# Patient Record
(demographics unavailable — no encounter records)

---

## 2024-12-10 NOTE — PROCEDURE
[de-identified] :   Procedure: Trigger Point Injections Physician: Dr. Smith Medication injected: Lidocaine 1%, 5cc total Sedation medications: None Estimated blood loss: None Complications: None   Technique: R/B/A to trigger point injection reviewed with patient. The patient is agreeable and wishes to proceed.   The patient was placed in the seated position and trigger points of the bilateral trapezius, levator scapulae and cervical paraspinals were identified. The area was prepped in normal sterile fashion with Chloroprep. A 27 gauge 1.25 inch needle was advanced into the palpable trigger points with reproduction of pain. After negative aspiration of heme, the above medications were injected into the trigger areas. Needle was then removed, bandaid placed over injection sites. There was no complications, the patient was provided with post injection instructions.

## 2024-12-10 NOTE — HISTORY OF PRESENT ILLNESS
[FreeTextEntry1] : Ms. DEEPAK HONG is a 59 year old female who presents for follow up. At last visit in 8/2024, she was given a TPI, continued on HEP, and continued on Gabapentin. She got significant relief (75%)  from TPIs for over 6 weeks until pain returned. She is still taking Gabapentin occasionally for the radicular pain. Denies any new symptoms.    Location: R>L posterior neck/shoulder region Onset:Chronic, progressive symptoms, patient went to chiropractor who exacerbated pain Provocation/Palliative: Worst with exercise, lifting heavy bags, reading when moving neck, TPI helped alleviate symptoms, muscle relaxant Quality: Dull aching Radiation: R>L neck to R>L shoulder Severity: 5/10 Timing: constant  No bowel/bladder changes. No groin numbness.

## 2024-12-10 NOTE — ASSESSMENT
[FreeTextEntry1] : Ms. DEEPAK HONG is a 59 year old female with a PMHx of Breast CA who presents with neck and upper back pain. Pain likely myofascial in nature. Has had good relief with TPIs in past. Did have some pain into her R shoulder/arm, improved with gabapentin. Denies any red flag signs. Will recommend: - TPI performed today, tolerated well - Continue HEP - Given that she gets good relief with TPIs, would not recommend AMILCAR at this time - Continue with Gabapentin 100-200mg PO Qhs if needed. Refill sent today. Patient taking as prn due to significant fatigue with medication when taken. Discussed efficacy improves with daily basis but if unable to tolerate daily continue w/ prn. Patient aware of side effects and risks including sedation, leg swelling, and possible mood changes. She will send me recent bloodwork for review.    RTC in 3-4 months or sooner if needed. Patient aware of red flag signs including any changes to their bowel/bladder control, groin numbness or new weakness. Patient knows to seek immediate attention by calling 911 or going to nearest ER if these symptoms appear.  This patient is being managed for a complex chronic pain that requires ongoing medical management. The nature of this condition requires a longitudinal relationship and monitoring over time for appropriate treatment.

## 2024-12-10 NOTE — PHYSICAL EXAM
[FreeTextEntry1] : PE:  Constitutional: In NAD, calm and cooperative  MSK (Neck):  Inspection: no gross swelling identified  Palpation: Tenderness of the R>L lower cervical paraspinals, R>L levator scapulae and R>L rhomboids, trigger points felt  ROM: Restricted ROM at cervical extension/flexion and rotation to the R , cervical rotation to the R exacerbates pain. Slight restricted ROM on R shoulder internal rotation. Some tenderness in the trapezius and neck whenR  lifting shoulder and shoulder adduction  Strength: 5/5 strength in bilateral upper extremities  Reflexes: 2+ Biceps/Brachioradialis/Triceps/patella/Achilles reflex bilaterally, Turner's/Clonus negative bilaterally  Sensation: Intact to light touch in bilateral upper extremities  Spurlings: negative bilaterally.

## 2024-12-10 NOTE — PROCEDURE
[de-identified] :   Procedure: Trigger Point Injections Physician: Dr. Smith Medication injected: Lidocaine 1%, 5cc total Sedation medications: None Estimated blood loss: None Complications: None   Technique: R/B/A to trigger point injection reviewed with patient. The patient is agreeable and wishes to proceed.   The patient was placed in the seated position and trigger points of the bilateral trapezius, levator scapulae and cervical paraspinals were identified. The area was prepped in normal sterile fashion with Chloroprep. A 27 gauge 1.25 inch needle was advanced into the palpable trigger points with reproduction of pain. After negative aspiration of heme, the above medications were injected into the trigger areas. Needle was then removed, bandaid placed over injection sites. There was no complications, the patient was provided with post injection instructions.

## 2024-12-10 NOTE — DATA REVIEWED
[FreeTextEntry1] : [facility]11/2023 reviewed CMP 2022 reviewed CBC 2022 reviewed  Patient said she just had labs done 12/2024 without significant findings, will send me results

## 2025-07-24 NOTE — ASSESSMENT
[FreeTextEntry1] : Ms. DEEPAK HONG is a 59 year old female with a PMHx of Breast CA who presents with neck and upper back pain. Pain likely myofascial in nature. Has had good relief with TPIs in past. Did have some pain into her R shoulder/arm, improved with gabapentin. Denies any red flag signs. Will recommend: - Continue HEP - Discussed R/B/A of a TPI for which we will work on authorization - Given that she gets good relief with TPIs, would not recommend AMILCAR at this time - Continue with Gabapentin 100-200mg PO Qhs if needed. Refill sent today. Patient aware of side effects and risks including sedation, leg swelling, and possible mood changes. Recent CMP reviewed.    RTC for TPI if authorization approved. Patient aware of red flag signs including any changes to their bowel/bladder control, groin numbness or new weakness. Patient knows to seek immediate attention by calling 911 or going to nearest ER if these symptoms appear.  This patient is being managed for a complex chronic pain that requires ongoing medical management. The nature of this condition requires a longitudinal relationship and monitoring over time for appropriate treatment.

## 2025-07-24 NOTE — DATA REVIEWED
[FreeTextEntry1] : [facility] 11/2023 reviewed CMP 2022 reviewed CBC 2022 reviewed CMP 12/2024 reviewed CBC 12/2024 reviewed

## 2025-07-24 NOTE — PHYSICAL EXAM
[FreeTextEntry1] : PE:  Constitutional: In NAD, calm and cooperative  MSK (Neck):  Inspection: no gross swelling identified  Palpation: Tenderness of the R>L lower cervical paraspinals, R>L levator scapulae and R>L rhomboids, trigger points felt  ROM: Restricted ROM at cervical extension/flexion and rotation to the R , cervical rotation to the R exacerbates pain. Slight restricted ROM on R shoulder internal rotation.   Strength: 5/5 strength in bilateral upper extremities  Reflexes: 2+ Biceps/Brachioradialis/Triceps/patella/Achilles reflex bilaterally, Turner's/Clonus negative bilaterally  Sensation: Intact to light touch in bilateral upper extremities  Spurlings: negative bilaterally.

## 2025-07-24 NOTE — HISTORY OF PRESENT ILLNESS
[FreeTextEntry1] : Ms. DEEPAK HONG is a 59 year old female who presents for follow up. At last visit in 12/2024, she was given a TPI, continued on HEP and continued on Gabapentin 100-200mg Qhs. She now presents with worsening pain, mainly over last few weeks. She got excellent relief from prior TPI, for about 6 months.    Location: R>L posterior neck/shoulder region Onset:Chronic, progressive symptoms, patient went to chiropractor who exacerbated pain Provocation/Palliative: Worst with exercise, lifting heavy bags, reading when moving neck, TPI helped alleviate symptoms, muscle relaxant Quality: Dull aching Radiation: R>L neck to R>L shoulder Severity: 5/10 Timing: constant  No bowel/bladder changes. No groin numbness.

## 2025-07-29 NOTE — HISTORY OF PRESENT ILLNESS
[FreeTextEntry1] : Ms. DEEPAK HONG is a 59 year old female who presents for follow up. At last visit, we discussed an TPI and continued on Gabapentin. She now presents for a TPI. Denies any new symptoms.    Location: R>L posterior neck/shoulder region Onset:Chronic, progressive symptoms, patient went to chiropractor who exacerbated pain Provocation/Palliative: Worst with exercise, lifting heavy bags, reading when moving neck, TPI helped alleviate symptoms, muscle relaxant Quality: Dull aching Radiation: R>L neck to R>L shoulder Severity: 5/10 Timing: constant  No bowel/bladder changes. No groin numbness.

## 2025-07-29 NOTE — ASSESSMENT
[FreeTextEntry1] : Ms. DEEPAK HONG is a 59 year old female with a PMHx of Breast CA who presents with neck and upper back pain. Pain likely myofascial in nature. Has had good relief with TPIs in past. Did have some pain into her R shoulder/arm, improved with gabapentin. Denies any red flag signs. Will recommend: - TPI performed today.  - Given that she gets good relief with TPIs, would not recommend AMILCAR at this time - Continue with Gabapentin 100-200mg PO Qhs if needed. Refill sent today. Patient aware of side effects and risks including sedation, leg swelling, and possible mood changes. Recent CMP reviewed.   RTC in 6 months. Patient aware of red flag signs including any changes to their bowel/bladder control, groin numbness or new weakness. Patient knows to seek immediate attention by calling 911 or going to nearest ER if these symptoms appear.  This patient is being managed for a complex chronic pain that requires ongoing medical management. The nature of this condition requires a longitudinal relationship and monitoring over time for appropriate treatment.

## 2025-07-29 NOTE — PROCEDURE
[de-identified] :   Procedure: Trigger Point Injections Physician: Dr. Smith Medication injected: Lidocaine 1%, 5cc total Sedation medications: None Estimated blood loss: None Complications: None   Technique: R/B/A to trigger point injection reviewed with patient. The patient is agreeable and wishes to proceed.   The patient was placed in the seated position and trigger points of the bilateral trapezius, levator scapulae and cervical paraspinals were identified. The area was prepped in normal sterile fashion with Chloroprep. A 27 gauge 1.25 inch needle was advanced into the palpable trigger points with reproduction of pain. After negative aspiration of heme, the above medications were injected into the trigger areas. Needle was then removed, bandaid placed over injection sites. There was no complications, the patient was provided with post injection instructions.